# Patient Record
Sex: FEMALE | Race: WHITE
[De-identification: names, ages, dates, MRNs, and addresses within clinical notes are randomized per-mention and may not be internally consistent; named-entity substitution may affect disease eponyms.]

---

## 2017-10-13 NOTE — EDM.PDOC
ED HPI GENERAL MEDICAL PROBLEM





- General


Chief Complaint: General


Stated Complaint: anxiety attack


Time Seen by Provider: 10/13/17 10:10


Source of Information: Reports: Patient, Family (son)


History Limitations: Reports: No Limitations





- History of Present Illness


INITIAL COMMENTS - FREE TEXT/NARRATIVE: 





States has been having increase in anxiety attacks.  Has been having them daily 

this week.  Usually they start in the afternoon or later evening but this one 

started this morning.  The anxiety is aggravating her parkinson's as her 

tremors are much worse which is causing her to have muscle aches and pains from 

that. States that she is not sure what is making her anxious but she worries 

about everything.  She is worried about her heart although she denies any chest 

pains.  Has been taking her meds as ordered and does see a counseler at [facility] 

by the name of Félix.  She denies missing any of her doses at this time.  Son 

does get her meds ready for her.  No suicidal thoughts noted.


Onset: Gradual


Duration: Getting Worse


Associated Symptoms: Denies: Chest Pain, Diaphoresis, Nausea/Vomiting, 

Shortness of Breath, Syncope





- Related Data


 Allergies











Allergy/AdvReac Type Severity Reaction Status Date / Time


 


amoxicillin Allergy  Cannot Verified 10/13/17 10:03





   Remember  


 


cephalexin [From Keflex] Allergy  Cannot Verified 10/13/17 10:03





   Remember  


 


clavulanic acid Allergy  Cannot Verified 10/13/17 10:03





[From Augmentin]   Remember  


 


lomefloxacin [From Maxaquin] Allergy  Cannot Verified 10/13/17 10:03





   Remember  


 


Quinolones Allergy  Cannot Verified 10/13/17 10:03





   Remember  


 


terbutaline [From Brethine] Allergy  Cannot Verified 10/13/17 10:03





   Remember  











Home Meds: 


 Home Meds





Aspirin [Low Dose Aspirin EC] 81 mg PO BEDTIME 10/17/16 [History]


Bisacodyl [Dulcolax] 10 mg RECTAL DAILY PRN 10/17/16 [History]


Carbidopa/Levodopa [Sinemet  mg Tablet] 1 tab PO ASDIRECTED 10/17/16 [

History]


Carboxymethylcellulose Sodium [Refresh Tears] 1 drop EYEBOTH BID 10/17/16 [

History]


Cyanocobalamin (Vitamin B12) [Vitamin B12] 1,000 mcg IM Q30D 10/17/16 [History]


Docusate Sodium [Colace] 100 mg PO DAILY 10/17/16 [History]


Levothyroxine Sodium 100 mcg PO DAILY 10/17/16 [History]


Melatonin 5 mg PO BEDTIME 10/17/16 [History]


Mirtazapine 30 mg PO BEDTIME 10/17/16 [History]


clonazePAM [Clonazepam] 0.5 mg PO 06,08,13 10/17/16 [History]


clonazePAM [Clonazepam] 1 mg PO 1600 10/17/16 [History]


Polyethylene Glycol 3350 [MiraLAX] 17 gm PO DAILY 01/04/17 [History]


Sucralfate 1 gm PO QID 09/27/17 [History]


busPIRone HCl [Buspirone HCl] 5 mg PO ASDIRECTED 09/27/17 [History]











Past Medical History


HEENT History: Reports: Sinusitis


Cardiovascular History: Reports: Hypertension


Neurological History: Reports: Parkinson's


Psychiatric History: Reports: Anxiety, Depression


Hematologic History: Reports: B12 Deficiency





- Past Surgical History


HEENT Surgical History: Reports: Tonsillectomy


Female  Surgical History: Reports: Tubal Ligation


Musculoskeletal Surgical History: Reports: Knee Replacement





Social & Family History





- Family History


Family Medical History: Noncontributory





- Tobacco Use


Smoking Status *Q: Never Smoker


Second Hand Smoke Exposure: No





- Recreational Drug Use


Recreational Drug Use: No





- Living Situation & Occupation


Living situation: Reports: Single, Alone


Occupation: Retired





ED ROS GENERAL





- Review of Systems


Review Of Systems: See Below


Constitutional: Reports: No Symptoms


HEENT: Reports: No Symptoms


Respiratory: Reports: No Symptoms


Cardiovascular: Reports: No Symptoms


GI/Abdominal: Reports: No Symptoms


: Reports: No Symptoms


Musculoskeletal: Reports: Muscle Pain.  Denies: Muscle Stiffness


Skin: Reports: No Symptoms


Neurological: Denies: Confusion, Dizziness, Headache


Psychiatric: Reports: Anxiety.  Denies: Hallucinations, Suicidal Ideation





ED EXAM, GENERAL





- Physical Exam


Exam: See Below


Exam Limited By: No Limitations


General Appearance: Alert, Anxious


Ears: Normal External Exam, Normal Canal, Normal TMs


Throat/Mouth: Normal Inspection, Normal Voice, No Airway Compromise


Head: Atraumatic, Normocephalic


Neck: Normal Inspection, Supple, Non-Tender, Full Range of Motion


Respiratory/Chest: No Respiratory Distress, Lungs Clear, Normal Breath Sounds, 

Chest Non-Tender


Cardiovascular: Regular Rate, Rhythm, No Edema


GI/Abdominal: Normal Bowel Sounds, Soft, Non-Tender, No Organomegaly


Extremities: Other (Has constant uncontrolled shaking of the right leg while 

lying on the bed.  Is able to stop it when she puts pressure on it or stands on 

it.)


Neurological: Alert, Oriented


Psychiatric: Anxious


Skin Exam: Warm, Dry





Course





- Vital Signs


Last Recorded V/S: 


 Last Vital Signs











Temp  98.2 F   10/13/17 11:05


 


Pulse  65   10/13/17 11:05


 


Resp  18   10/13/17 11:05


 


BP  146/80 H  10/13/17 11:05


 


Pulse Ox  97   10/13/17 11:05














- Orders/Labs/Meds


Labs: 


 Laboratory Tests











  10/13/17 10/13/17 10/13/17 Range/Units





  10:49 10:51 10:51 


 


WBC   6.9   (5.0-10.0)  10^3/uL


 


RBC   4.64   (4.00-5.50)  10^6/uL


 


Hgb   13.6   (12.0-16.0)  g/dL


 


Hct   40.6   (37.0-47.0)  %


 


MCV   87.5   (82.0-94.0)  fL


 


MCH   29.3   (27.0-32.0)  pg


 


MCHC   33.5   (33.0-38.0)  g/dL


 


RDW Coeff of Marly   13.4   (11.0-15.0)  %


 


Plt Count   166   (150-400)  10^3/uL


 


Neut % (Auto)   77.4   (35-85)  %


 


Lymph % (Auto)   16.2   (10-55)  %


 


Mono % (Auto)   6.0   (0-16)  %


 


Eos % (Auto)   0.3   (0-5)  %


 


Baso % (Auto)   0.1   (0-3)  %


 


Neut # (Auto)   5.31   (1.80-7.00)  10^3/uL


 


Lymph # (Auto)   1.11   (1.00-4.80)  10^3/uL


 


Mono # (Auto)   0.41   (0.00-0.80)  10^3/uL


 


Eos # (Auto)   0.02   (0.00-0.45)  10^3/uL


 


Baso # (Auto)   0.01   10^3/uL


 


Sodium    140  (136-145)  mEq/L


 


Potassium    4.7  (3.5-5.0)  mEq/L


 


Chloride    105  ()  mEq/L


 


Carbon Dioxide    29  (21-32)  mmol/L


 


BUN    19 H  (7-18)  mg/dL


 


Creatinine    0.7  (0.6-1.0)  mg/dL


 


Est Cr Clr Drug Dosing    TNP  


 


Estimated GFR (MDRD)    > 60  (>=60)  mL/min


 


Glucose    101 H  (75-99)  mg/dL


 


Calcium    8.9  (8.4-10.1)  mg/dL


 


Total Bilirubin    0.3  (0.0-1.0)  mg/dL


 


AST    16  (15-37)  U/L


 


ALT    13  (12-78)  U/L


 


Alkaline Phosphatase    107  ()  U/L


 


Total Protein    7.0  (6.4-8.2)  g/dL


 


Albumin    3.9  (3.4-5.0)  g/dL


 


TSH, Ultra Sensitive    0.11 L  (0.36-5.60)  uIU/mL


 


Urine Color  Yellow    (YELLOW)  


 


Urine Appearance  Clear    (CLEAR)  


 


Urine pH  7.5    (4.5-8.0)  


 


Ur Specific Gravity  1.015    (1.003-1.020)  


 


Urine Protein  Negative    (NEGATIVE)  mg/dL


 


Urine Glucose (UA)  Negative    (NEGATIVE)  mg/dL


 


Urine Ketones  Negative    (NEGATIVE)  mg/dL


 


Urine Occult Blood  Negative    (NEGATIVE)  


 


Urine Nitrite  Negative    (NEGATIVE)  


 


Urine Bilirubin  Negative    (NEGATIVE)  


 


Urine Urobilinogen  1.0    (0.2-1.0)  EU/dL


 


Ur Leukocyte Esterase  Negative    (NEGATIVE)  


 


Urine RBC  Not seen    (0-5)  /HPF


 


Urine WBC  Not seen    (0-5)  /HPF


 


Ur Squamous Epith Cells  Occasional H    (NOT SEEN)  /HPF











Meds: 


Medications














Discontinued Medications














Generic Name Dose Route Start Last Admin





  Trade Name Freq  PRN Reason Stop Dose Admin


 


Lorazepam  Confirm  10/13/17 12:01  10/13/17 12:42





  Ativan  Administered  10/13/17 12:02  Not Given





  Dose   





  2 mg   





  .ROUTE   





  .STK-MED ONE   


 


Lorazepam  1 mg  10/13/17 12:00  10/13/17 12:10





  Ativan  IM  10/13/17 12:01  1 mg





  ONETIME ONE   Administration














Departure





- Departure


Time of Disposition: 11:51


Disposition: Home, Self-Care 01


Condition: Good


Clinical Impression: 


 Anxiety








- Discharge Information


Forms:  ED Department Discharge


Additional Instructions: 


Increase the clonazepam to 1 mg 4 times a day.  


Keep appt with Dr. Angulo on Tuesday








- Problem List & Annotations


(1) Anxiety


SNOMED Code(s): 04564884


   Code(s): F41.9 - ANXIETY DISORDER, UNSPECIFIED   Status: Chronic   Priority: 

High   





- Problem List Review


Problem List Initiated/Reviewed/Updated: Yes





- Assessment/Plan


Plan: 





Discussed with DR. Angulo and recommend med changes as noted above.  Dr. Angulo 

will see on follow up Tuesday.  Return to the ER if symptoms do not improve

## 2017-10-19 NOTE — PCM.PN
- General Info


Date of Service: 10/18/17


Admission Dx/Problem (Free Text): 





Short of Breath


Anxiety





Functional Status: Reports: Pain Controlled, Tolerating Diet





- Review of Systems


General: Reports: Weakness, Fatigue.  Denies: Fever


HEENT: Reports: No Symptoms


Pulmonary: Denies: Shortness of Breath, Cough, Sputum


Cardiovascular: Denies: Chest Pain, Edema, Lightheadedness


Gastrointestinal: Denies: Abdominal Pain, Nausea, Vomiting


Genitourinary: Reports: No Symptoms


Musculoskeletal: Reports: No Symptoms


Skin: Reports: No Symptoms


Neurological: Reports: No Symptoms


Psychiatric: Reports: Anxiety





- Patient Data


Vitals - Most Recent: 


 Last Vital Signs











Temp  97.1 F   10/19/17 04:00


 


Pulse  68   10/19/17 04:00


 


Resp  18   10/19/17 04:00


 


BP  130/59 L  10/19/17 04:00


 


Pulse Ox  98   10/19/17 04:00











Weight - Most Recent: 135 lb 11.2 oz


Med Orders - Current: 


 Current Medications





Acetaminophen (Tylenol)  650 mg PO Q4H PRN


   PRN Reason: Pain (Mild 1-3)/fever


   Last Admin: 10/19/17 03:50 Dose:  650 mg


Artificial Tears (Liquitears 1.4% Ophth Soln)  0 ml EYEBOTH 0700,2100 [facility]


   Last Admin: 10/19/17 06:57 Dose:  1 drop


Aspirin (Halfprin)  81 mg PO 2100 [facility]


   Last Admin: 10/18/17 20:52 Dose:  81 mg


Bisacodyl (Dulcolax)  10 mg RECTAL DAILY PRN


   PRN Reason: Constipation


Carbidopa/Levodopa (Sinemet  Mg)  1 tab PO 1700,1900 [facility]


   Last Admin: 10/18/17 18:58 Dose:  1 tab


Carbidopa/Levodopa (Sinemet  Mg)  1.5 tab PO 07,09,11,13,15 [facility]


   Last Admin: 10/19/17 06:57 Dose:  1.5 tab


Clonazepam (Klonopin)  1 mg PO 0700,0900,1300,1600 [facility]


   Last Admin: 10/19/17 06:57 Dose:  1 mg


Cyanocobalamin (Vitamin B12)  1,000 mcg IM Q30D [facility]


Enoxaparin Sodium (Lovenox)  30 mg SUBCUT Q24H [facility]


   Last Admin: 10/18/17 20:52 Dose:  30 mg


Levothyroxine Sodium (Synthroid)  100 mcg PO 0700 [facility]


   Last Admin: 10/19/17 07:00 Dose:  100 mcg


Mirtazapine (Remeron)  30 mg PO 2100 [facility]


   Last Admin: 10/18/17 20:52 Dose:  30 mg


Ptom[Melatonin] 5 Mg ())  5 mg PO 2100 [facility]


   Last Admin: 10/18/17 20:53 Dose:  Not Given


Ondansetron HCl (Zofran Odt)  4 mg PO Q6H PRN


   PRN Reason: Nausea/Vomiting


   Last Admin: 10/17/17 15:23 Dose:  4 mg


Polyethylene Glycol (Miralax)  17 gm PO DAILY [facility]


   Last Admin: 10/19/17 08:01 Dose:  17 gm


Sertraline HCl (Zoloft)  50 mg PO 1100 [facility]


   Last Admin: 10/18/17 11:00 Dose:  50 mg


Sodium Chloride (Saline Flush)  10 ml FLUSH ASDIRECTED PRN


   PRN Reason: Keep Vein Open


Temazepam (Restoril)  15 mg PO BEDTIME PRN


   PRN Reason: Sleep


   Last Admin: 10/17/17 20:07 Dose:  15 mg











- Exam


General: Alert, Oriented


HEENT: Mucous Membr. Moist/Pink


Neck: Supple


Lungs: Clear to Auscultation, Normal Respiratory Effort


Cardiovascular: Regular Rate, Regular Rhythm


GI/Abdominal Exam: Normal Bowel Sounds, Soft, Non-Tender


Skin: Warm, Dry


Neurological: No New Focal Deficit





- Problem List & Annotations


(1) Anxiety


SNOMED Code(s): 25325459


   Code(s): F41.9 - ANXIETY DISORDER, UNSPECIFIED   Status: Chronic   Priority: 

High   Current Visit: Yes   





(2) Shortness of breath


SNOMED Code(s): 202095000


   Code(s): R06.02 - SHORTNESS OF BREATH   Status: Acute   Priority: High   

Current Visit: Yes   





- Problem List Review


Problem List Initiated/Reviewed/Updated: Yes





- Assessment


Assessment:: 





Anxiety


SOB





- Plan


Plan:: 





Patient doing better today.  States less short of breath today, feels anxiety 

is better.  Does admit to being very depressed.  Cardiac enzymes, d-dimer, work 

up on admit was negative.  Tolerating diet.





Encouraged to ambulate.  Possible discharge home tomorrow.

## 2017-10-19 NOTE — PCM.PN
- General Info


Date of Service: 10/19/17


Admission Dx/Problem (Free Text): 





Short of Breath


Anxiety





Functional Status: Reports: Pain Controlled, Tolerating Diet, Ambulating





- Review of Systems


General: Reports: Weakness, Fatigue.  Denies: Fever


HEENT: Reports: No Symptoms


Pulmonary: Denies: Shortness of Breath, Cough


Cardiovascular: Denies: Chest Pain, Edema, Lightheadedness


Gastrointestinal: Denies: Abdominal Pain, Nausea, Vomiting


Musculoskeletal: Reports: No Symptoms


Skin: Reports: No Symptoms


Psychiatric: Reports: Depression, Anxiety





- Patient Data


Vitals - Most Recent: 


 Last Vital Signs











Temp  97.7 F   10/19/17 08:00


 


Pulse  95   10/19/17 08:00


 


Resp  20   10/19/17 08:00


 


BP  134/47 L  10/19/17 08:00


 


Pulse Ox  96   10/19/17 08:00











Weight - Most Recent: 135 lb 11.2 oz


Med Orders - Current: 


 Current Medications





Acetaminophen (Tylenol)  650 mg PO Q4H PRN


   PRN Reason: Pain (Mild 1-3)/fever


   Last Admin: 10/19/17 03:50 Dose:  650 mg


Artificial Tears (Liquitears 1.4% Ophth Soln)  0 ml EYEBOTH 0700,2100 [facility]


   Last Admin: 10/19/17 06:57 Dose:  1 drop


Aspirin (Halfprin)  81 mg PO 2100 [facility]


   Last Admin: 10/18/17 20:52 Dose:  81 mg


Bisacodyl (Dulcolax)  10 mg RECTAL DAILY PRN


   PRN Reason: Constipation


Carbidopa/Levodopa (Sinemet  Mg)  1 tab PO 1700,1900 [facility]


   Last Admin: 10/18/17 18:58 Dose:  1 tab


Carbidopa/Levodopa (Sinemet  Mg)  1.5 tab PO 07,09,11,13,15 [facility]


   Last Admin: 10/19/17 10:59 Dose:  1.5 tab


Clonazepam (Klonopin)  1 mg PO 0700,0900,1300,1600 [facility]


   Last Admin: 10/19/17 09:03 Dose:  1 mg


Cyanocobalamin (Vitamin B12)  1,000 mcg IM Q30D [facility]


Enoxaparin Sodium (Lovenox)  30 mg SUBCUT Q24H [facility]


   Last Admin: 10/18/17 20:52 Dose:  30 mg


Levothyroxine Sodium (Synthroid)  100 mcg PO 0700 [facility]


   Last Admin: 10/19/17 07:00 Dose:  100 mcg


Mirtazapine (Remeron)  30 mg PO 2100 [facility]


   Last Admin: 10/18/17 20:52 Dose:  30 mg


Ptom[Melatonin] 5 Mg ())  5 mg PO 2100 [facility]


   Last Admin: 10/18/17 20:53 Dose:  Not Given


Ondansetron HCl (Zofran Odt)  4 mg PO Q6H PRN


   PRN Reason: Nausea/Vomiting


   Last Admin: 10/17/17 15:23 Dose:  4 mg


Polyethylene Glycol (Miralax)  17 gm PO DAILY [facility]


   Last Admin: 10/19/17 08:01 Dose:  17 gm


Sertraline HCl (Zoloft)  50 mg PO 1100 [facility]


   Last Admin: 10/19/17 10:59 Dose:  50 mg


Sodium Chloride (Saline Flush)  10 ml FLUSH ASDIRECTED PRN


   PRN Reason: Keep Vein Open


Temazepam (Restoril)  15 mg PO BEDTIME PRN


   PRN Reason: Sleep


   Last Admin: 10/17/17 20:07 Dose:  15 mg











- Exam


General: Alert, Oriented


HEENT: Mucous Membr. Moist/Pink


Neck: Supple


Lungs: Clear to Auscultation, Normal Respiratory Effort


Cardiovascular: Regular Rate, Regular Rhythm


GI/Abdominal Exam: Normal Bowel Sounds, Soft, Non-Tender


Extremities: Normal Inspection, No Pedal Edema


Skin: Warm, Dry


Neurological: No New Focal Deficit





- Problem List & Annotations


(1) Depression


SNOMED Code(s): 81805525


   Code(s): F32.9 - MAJOR DEPRESSIVE DISORDER, SINGLE EPISODE, UNSPECIFIED   

Status: Acute   Priority: High   Current Visit: Yes   


Qualifiers: 


   Depression Type: major depressive disorder   Major depression recurrence: 

recurrent   Active/Remission status: currently active   Major depression 

episode severity: severe   Psychotic features: without psychotic features   

Qualified Code(s): F33.2 - Major depressive disorder, recurrent severe without 

psychotic features   





(2) Anxiety


SNOMED Code(s): 40427483


   Code(s): F41.9 - ANXIETY DISORDER, UNSPECIFIED   Status: Chronic   Priority: 

High   Current Visit: Yes   





(3) Shortness of breath


SNOMED Code(s): 088323170


   Code(s): R06.02 - SHORTNESS OF BREATH   Status: Acute   Priority: High   

Current Visit: Yes   





- Problem List Review


Problem List Initiated/Reviewed/Updated: Yes





- Assessment


Assessment:: 


Depression


Anxiety


SOB





- Plan


Plan:: 





Patient doing better today.  States less short of breath today, feels anxiety 

is better.  Does admit to being very depressed.  Cardiac enzymes, d-dimer, work 

up on admit was negative.  Tolerating diet.





Encouraged to ambulate.  Possible discharge home tomorrow.





10-


Patient much more down today, sad.  Talking of death and how she wishes she 

"could just die".  Denies being suicidal but does feel very hopeless.  Not 

wanting to get up and moving much today.  Denies feeling short of breath.  Does 

still feel anxious. 





Did discuss nursing home placement with patient as has had difficulty in the 

past especially during the winter months with intractable depression and do 

feel she is vulnerable as she admits that she doesn't much cook for herself, 

doesn't drive.  States only eats microwave food as she doesn't trust herself 

using the stove.  Staff will consult son in regards to placement versus home 

with home health.

## 2017-10-23 NOTE — DISCH
ADMISSION DIAGNOSES:

1. Shortness of breath.

2. Severe depression.

3. Anxiety.

4. Parkinson disease.

5. Hypothyroidism.

 

DISCHARGE DIAGNOSIS:

1. SHORTNESS OF BREATH.

2. SEVERE DEPRESSION.

3. ANXIETY.

4. PARKINSON DISEASE.

5. HYPOTHYROIDISM.

 

HISTORY:  The patient presented to my office with ongoing issues with shortness

of breath.  She has been having severe issues with her depression and has a

history of longstanding major depressive disorder.  She has been having

increasing anxiety and the assumption is her shortness of breath is when her

anxiety is out of control.  We elected to put her in the hospital for

appropriate cares and workup to rule out any other pathology for her shortness

of breath.  At the time of her admission, her initial workup including chest x-

ray, EKG, and lab work were all fine.  She had negative cardiac enzymes and D-

dimer.  We elected to put her into the hospital for acute care and monitor and

do medication adjustments.

 

HOSPITAL COURSE:  The patient had her Klonopin increased while here.  She made a

some improvement with her anxiety.  She is still having issues.  She is starting

now to have more and more issues which she describes as hallucination.  It is

hard to tell if these are in dreams because they do happen at night, but she

says they feel very real.  She has severe depression and this has been

recalcitrant in the past.  She has been on Abilify without success.  We never

had her on the atypical antipsychotic Seroquel and I think that is the next

step.  For the most part, she has been stable here from a cardiopulmonary

standpoint and at this time she is a debilitated, lives at home alone and just

could not take care of herself due to her severe depression and anxiety.  We are

going to put her in swing bed.  I am going to take her off Remeron.  I am going

to put her on Seroquel.  We will start a low dose of 25 mg at night and titrate

up.  Plan to back off her Klonopin as she has been little more sedating.  We

will use a lowly scheduled dose of Ativan and then a p.r.n. dose as well.  She

will be put in swing bed for a short-term stay.  We will entertain nursing home

placement as well.

 

COMPLICATIONS:  During her stay were none.

 

CONSULTATIONS:  PT.

 

DISPOSITION:  Discharged to swing bed.

 

SARAH/LISA

DD:  10/20/2017 11:53:13

DT:  10/21/2017 01:18:23

Job #:  [id number]

## 2017-10-25 NOTE — PCM.DCSUM1
**Discharge Summary





- Hospital Course


Free Text/Narrative:: 





Patient admitted to swing bed with ongoing mental health concerns.  Has had 

ongoing issues with depression and anxiety.  Difficulty managing at home 

without her son involvement for assistance and meds.  Patient has 

hallucinations or description of dreams as these do occur at night.  Patient 

has tried multiple medications in the past for her mental health issues.  While 

admitted in acute, did have clonazepam increased.  Seroquel added on admission 

to swing bed as well as Zoloft increased.  Patient does often discuss issues 

with wanting to "be dead" but she also admits that she has never had a plan nor 

would she ever "actually try anything".  Admitted to swing bed for ongoing 

observation due to medication changes as she is a vulnerable patient and plan 

for nursing home admission with psychiatric care while there.





- Discharge Data


Discharge Date: 10/25/17


Discharge Disposition: DC/Tfer to [facility]


Condition: Fair





- Patient Summary/Data


Complications: none


Consults: 


 Consultations





10/20/17 13:21


Consult to  [CONS] Routine 





10/20/17 14:41


PT Evaluation and Treatment [CONS] Routine 











Hospital Course: 





Patient does continue to have episodes of increased anxiety and restlessness 

while in swing bed.  Does get anxious with changes, has ongoing "dreams".  Does 

calm with involvement of nursing staff.  Arrangements for ongoing care 

discussed with son due to her mood changes/anxiety.  Is agreeable to nursing 

home until medication adjustments are felt to be beneficial with plans to 

eventually be discharged back home.  Did have episode of chest pain with 

negative troponin and EKG





Will continue with increased dose of Zoloft, Seroquel.  Continue Clonazepam and 

Buspar.  





- Patient Instructions


Diet: Usual Diet as Tolerated


Activity: As Tolerated





- Discharge Plan


Prescriptions/Med Rec: 


QUEtiapine [SEROquel] 50 mg PO 2100 #30 tablet


Sertraline [Zoloft] 75 mg PO 1100 #30 tablet


Home Medications: 


 Home Meds





Aspirin [Low Dose Aspirin EC] 81 mg PO 2100 10/17/16 [History]


Bisacodyl [Dulcolax] 10 mg RECTAL DAILY PRN 10/17/16 [History]


Carbidopa/Levodopa [Sinemet  mg Tablet] 1.5 tab PO 07,09,11,13,15 10/17/

16 [History]


Carboxymethylcellulose Sodium [Refresh Tears] 1 drop EYEBOTH BID 10/17/16 [

History]


Cyanocobalamin (Vitamin B12) [Vitamin B12] 1,000 mcg IM Q30D 10/17/16 [History]


Levothyroxine Sodium 100 mcg PO 0700 10/17/16 [History]


Melatonin 5 mg PO 2100 10/17/16 [History]


Polyethylene Glycol 3350 [MiraLAX] 17 gm PO DAILY 01/04/17 [History]


Sucralfate 1 gm PO 0700,1100,1500 09/27/17 [History]


busPIRone HCl [Buspirone HCl] 5 mg PO 0700,1100,1600 09/27/17 [History]


Carbidopa/Levodopa [Sinemet  mg Tablet] 1 tab PO 1700,1900 10/17/17 [

History]


ClonazePAM [KlonoPIN] 0.5 mg PO 0700,0900,1300,1600  tablet 10/25/17 [Rx]


QUEtiapine [SEROquel] 50 mg PO 2100 #30 tablet 10/25/17 [Rx]


Sertraline [Zoloft] 75 mg PO 1100 #30 tablet 10/25/17 [Rx]











- Discharge Summary/Plan Comment


DC Time >30 min.: No (Discharge time)


Discharge Summary/Plan Comment: 





Discharge to [facility].  Continue Zoloft, Seroquel, Buspar and Clonazepam.





Time spent with patient 15 minutes


Time for orders 15 minutes


Documentation 10 minutes





- General Info


Date of Service: 10/25/17


Admission Dx/Problem (Free Text: 





Depression


Anxiety





Functional Status: Reports: Pain Controlled, Tolerating Diet, Ambulating





- Review of Systems


General: Denies: Fever, Weakness, Fatigue


HEENT: Reports: No Symptoms


Pulmonary: Denies: Shortness of Breath, Cough


Cardiovascular: Denies: Chest Pain, Edema, Lightheadedness


Gastrointestinal: Reports: No Symptoms


Genitourinary: Reports: No Symptoms


Musculoskeletal: Reports: No Symptoms


Skin: Reports: No Symptoms


Psychiatric: Reports: Depression, Anxiety, Hallucinations





- Patient Data


Vitals - Most Recent: 


 Last Vital Signs











Temp  96.4 F   10/25/17 08:00


 


Pulse  76   10/25/17 08:00


 


Resp  20   10/25/17 08:00


 


BP  89/51 L  10/25/17 08:00


 


Pulse Ox  95   10/25/17 08:00











Weight - Most Recent: 138 lb


Med Orders - Current: 


 Current Medications








Discontinued Medications





Acetaminophen (Tylenol)  650 mg PO Q4H PRN


   PRN Reason: Pain (Mild 1-3)/fever


   Last Admin: 10/24/17 22:35 Dose:  650 mg


Artificial Tears (Liquitears 1.4% Ophth Soln)  0 ml EYEBOTH 0700,2100 [facility]


   Last Admin: 10/25/17 06:23 Dose:  1 drop


Aspirin (Halfprin)  81 mg PO 2100 [facility]


   Last Admin: 10/24/17 20:05 Dose:  81 mg


Bisacodyl (Dulcolax)  10 mg RECTAL DAILY PRN


   PRN Reason: Constipation


Carbidopa/Levodopa (Sinemet  Mg)  1 tab PO 1700,1900 [facility]


   Last Admin: 10/24/17 18:54 Dose:  1 tab


Carbidopa/Levodopa (Sinemet  Mg)  1.5 tab PO 07,09,11,13,15 [facility]


   Last Admin: 10/25/17 10:52 Dose:  1.5 tab


Clonazepam (Klonopin)  0.5 mg PO 0700,0900,1300,1600 [facility]


   Last Admin: 10/25/17 09:07 Dose:  0.5 mg


Cyanocobalamin (Vitamin B12)  1,000 mcg IM Q30D [facility]


Enoxaparin Sodium (Lovenox)  30 mg SUBCUT Q24H [facility]


   Last Admin: 10/24/17 20:06 Dose:  30 mg


Ibuprofen (Motrin)  400 mg PO Q6H PRN


   PRN Reason: Pain


   Last Admin: 10/24/17 17:20 Dose:  400 mg


Levothyroxine Sodium (Synthroid)  100 mcg PO 0700 [facility]


   Last Admin: 10/25/17 06:23 Dose:  100 mcg


Lorazepam (Ativan)  0.5 mg IVPUSH BID [facility]


   Last Admin: 10/23/17 08:01 Dose:  0.5 mg


Ptom [Melatonin] 5 (Mg))  5 mg PO 2100 [facility]


   Last Admin: 10/24/17 20:06 Dose:  Not Given


Ondansetron HCl (Zofran Odt)  4 mg PO Q6H PRN


   PRN Reason: Nausea/Vomiting


Polyethylene Glycol (Miralax)  17 gm PO DAILY [facility]


   Last Admin: 10/25/17 08:07 Dose:  17 gm


Quetiapine Fumarate (Seroquel)  25 mg PO 2100 [facility]


   Stop: 10/22/17 23:59


   Last Admin: 10/22/17 20:37 Dose:  25 mg


Quetiapine Fumarate (Seroquel)  50 mg PO 2100 [facility]


   Last Admin: 10/24/17 20:05 Dose:  50 mg


Quetiapine Fumarate (Seroquel) Confirm Administered Dose 25 mg .ROUTE .STK-MED 

ONE


   Stop: 10/21/17 20:38


   Last Admin: 10/21/17 20:32 Dose:  Not Given


Sertraline HCl (Zoloft)  75 mg PO 1100 [facility]


   Last Admin: 10/25/17 10:52 Dose:  75 mg


Sodium Chloride (Saline Flush)  10 ml FLUSH ASDIRECTED PRN


   PRN Reason: Keep Vein Open











- Exam


General: Reports: Alert, Oriented


HEENT: Reports: Mucous Membr. Moist/Pink


Neck: Reports: Supple


Lungs: Reports: Clear to Auscultation, Normal Respiratory Effort


Cardiovascular: Reports: Regular Rate, Regular Rhythm


GI/Abdominal Exam: Normal Bowel Sounds, Soft, Non-Tender





*Q Meaningful Use (DIS)





- VTE *Q


VTE Criteria *Q: 








- Stroke *Q


Stroke Criteria *Q: 








- AMI *Q


AMI Criteria *Q:

## 2020-06-05 ENCOUNTER — HOSPITAL ENCOUNTER (EMERGENCY)
Dept: HOSPITAL 38 - CC.ED | Age: 75
Discharge: HOME | End: 2020-06-05
Payer: MEDICARE

## 2020-06-05 VITALS — DIASTOLIC BLOOD PRESSURE: 77 MMHG | SYSTOLIC BLOOD PRESSURE: 135 MMHG | HEART RATE: 94 BPM

## 2020-06-05 DIAGNOSIS — K63.89: Primary | ICD-10-CM

## 2020-06-05 DIAGNOSIS — Z51.5: ICD-10-CM

## 2020-06-05 DIAGNOSIS — G20: ICD-10-CM

## 2020-06-05 DIAGNOSIS — F03.90: ICD-10-CM

## 2020-06-05 DIAGNOSIS — R59.0: ICD-10-CM

## 2020-06-05 DIAGNOSIS — E03.9: ICD-10-CM

## 2020-06-05 DIAGNOSIS — Z88.1: ICD-10-CM

## 2020-06-05 DIAGNOSIS — Z88.8: ICD-10-CM

## 2020-06-05 DIAGNOSIS — Z79.82: ICD-10-CM

## 2020-06-05 DIAGNOSIS — F32.9: ICD-10-CM

## 2020-06-05 DIAGNOSIS — Z79.899: ICD-10-CM

## 2020-06-05 DIAGNOSIS — R16.0: ICD-10-CM

## 2020-06-05 DIAGNOSIS — N63.20: ICD-10-CM

## 2020-06-05 DIAGNOSIS — F41.9: ICD-10-CM

## 2020-06-05 LAB
CHLORIDE SERPL-SCNC: 101 MEQ/L (ref 98–106)
SODIUM SERPL-SCNC: 137 MEQ/L (ref 136–145)

## 2020-06-05 NOTE — EDM.PDOC
ED HPI GENERAL MEDICAL PROBLEM





- General


Chief Complaint: General


Stated Complaint: RLQ pain, hallucinations


Time Seen by Provider: 06/05/20 07:48


Source of Information: Reports: Patient


History Limitations: Reports: No Limitations





- History of Present Illness


INITIAL COMMENTS - FREE TEXT/NARRATIVE: 





Joana is a 76 yo female with PMH of Parkinsons, dementia, hypothyroidism, 

anxiety, and recalcitrant depression, who presents to the ED via [facility] with c/o abdominal pain. Patient resides at [facility]. Reportedly called EMS 

per self with c/o abdominal pain and making bizarre accusations that 3 people 

were murdered in the room next to her. She reportedly has been having more 

hallucinations lately. Was recently started on Seroquel. Has been having some 

medication changes per her psychiatrist as her hallucinations and paranoia have 

increased. Apparently had recent significant adverse reaction to Lexapro. Has 

been doing better since Lexapro stopped. 





Patient is alert to person, place, and time. Is unsure where she lives. She 

reports that for approximately the past 5 days she has had decreased appetite 

and not feeling well. Reports that she believes her abdominal pain has been 

ongoing at least the last 3-4 days, but most likely longer. Reports she cannot 

remember exactly. She reports her abdomen feels tight and "full." Has been 

having 3-4 loose stools a day the last few days per her report. She denies any N

/V/D, urinary symptoms, fever, chills, malaise, chest pain, or shortness of 

breath. 


Onset: Gradual


Onset Date: 06/01/20


Duration: Getting Worse


Location: Reports: Abdomen


Quality: Reports: Ache, Other ("Full")


Severity: Severe


Associated Symptoms: Reports: Confusion, Loss of Appetite.  Denies: Chest Pain, 

Cough, cough w sputum, Diaphoresis, Fever/Chills, Headaches, Malaise, Nausea/

Vomiting, Rash, Seizure, Shortness of Breath, Syncope, Weakness


  ** Right Lower Abdominal


Pain Score (Numeric/FACES): 9





- Related Data


 Allergies











Allergy/AdvReac Type Severity Reaction Status Date / Time


 


amoxicillin Allergy  Cannot Verified 06/05/20 07:46





   Remember  


 


cephalexin [From Keflex] Allergy  Cannot Verified 06/05/20 07:46





   Remember  


 


clavulanic acid Allergy  Cannot Verified 06/05/20 07:46





[From Augmentin]   Remember  


 


escitalopram [From Lexapro] Allergy  Cannot Verified 06/05/20 07:45





   Remember  


 


lomefloxacin [From Maxaquin] Allergy  Cannot Verified 06/05/20 07:46





   Remember  


 


Quinolones Allergy  Cannot Verified 06/05/20 07:46





   Remember  


 


terbutaline [From Brethine] Allergy  Cannot Verified 06/05/20 07:46





   Remember  











Home Meds: 


 Home Meds





Aspirin [Low Dose Aspirin EC] 81 mg PO BEDTIME 10/17/16 [History]


Carbidopa/Levodopa [Sinemet  mg Tablet] 1.5 tab PO 0700 10/17/16 [History]


Cyanocobalamin (Vitamin B12) [Vitamin B12] 1,000 mcg IM Q30D 10/17/16 [History]


Levothyroxine Sodium 100 mcg PO DAILY 10/17/16 [History]


Melatonin 5 mg PO BEDTIME 10/17/16 [History]


Polyethylene Glycol 3350 [MiraLAX] 8.5 gm PO DAILY 01/04/17 [History]


busPIRone HCl [Buspirone HCl] 15 mg PO 0700,1100,1600 09/27/17 [History]


Carbidopa/Levodopa [Sinemet  mg Tablet] 2 tab PO 1000,1300 10/17/17 [

History]


Carbidopa/Levodopa [Carbidopa-Levodopa ] 1 tab PO 1600,1900 06/05/20 [

History]


ClonazePAM [KlonoPIN] 1 mg PO BID 06/05/20 [History]


Diphenhyd/Lidocaine/Nystatin [Magic Mouthwash] 1 tsp PO TID PRN 06/05/20 [

History]


Lactulose [Cephulac] 20 gm PO BID #60 cup 06/05/20 [Rx]


Mag Hydrox/Aluminum Hyd/Simeth [Mag-Al Hydrox-Simeth Max Susp] 30 ml PO QID PRN 

06/05/20 [History]


Mirtazapine 30 mg PO BEDTIME 06/05/20 [History]


Ondansetron [Zofran] 4 mg PO Q8H PRN 06/05/20 [History]


Pantoprazole Sodium [Protonix] 40 mg PO QAM 06/05/20 [History]


QUEtiapine Fumarate [Quetiapine Fumarate] 100 mg PO BEDTIME 06/05/20 [History]


Tetrahydrozoline HCl [Visine] 1 drop EYEBOTH BID 06/05/20 [History]


amantadine HCL [Amantadine] 100 mg PO QAM 06/05/20 [History]


oxyCODONE 5 - 10 mg PO Q4HR PRN #90 tab 06/05/20 [Rx]











Past Medical History


HEENT History: Reports: Cataract, Sinusitis


Cardiovascular History: Reports: Hypertension


OB/GYN History: Reports: Pregnancy


Neurological History: Reports: Parkinson's


Psychiatric History: Reports: Anxiety, Depression


Endocrine/Metabolic History: Reports: Hypothyroidism


Hematologic History: Reports: Anemia, B12 Deficiency





- Past Surgical History


HEENT Surgical History: Reports: Cataract Surgery, Tonsillectomy


Female  Surgical History: Reports: Tubal Ligation


Musculoskeletal Surgical History: Reports: Knee Replacement, Other (See Below)


Other Musculoskeletal Surgeries/Procedures:: BILAT FOOT SURGERY





Social & Family History





- Family History


Family Medical History: Noncontributory





- Caffeine Use


Caffeine Use: Reports: Coffee





- Living Situation & Occupation


Living situation: Reports: Single, Alone


Occupation: Retired





ED ROS GENERAL





- Review of Systems


Review Of Systems: See Below


Constitutional: Reports: Decreased Appetite.  Denies: Fever, Chills, Malaise, 

Weakness, Fatigue


HEENT: Reports: No Symptoms


Respiratory: Reports: No Symptoms.  Denies: Shortness of Breath, Cough


Cardiovascular: Reports: No Symptoms.  Denies: Chest Pain, Edema


Endocrine: Reports: No Symptoms


GI/Abdominal: Reports: Abdominal Pain, Diarrhea, Decreased Appetite.  Denies: 

Black Stool, Bloody Stool, Constipation, Nausea, Vomiting


: Reports: Dysuria.  Denies: Flank Pain, Frequency, Urgency


Musculoskeletal: Reports: No Symptoms


Skin: Reports: No Symptoms


Neurological: Reports: Confusion


Psychiatric: Reports: Anxiety, Confusion, Depression, Hallucinations


Hematologic/Lymphatic: Reports: No Symptoms


Immunologic: Reports: No Symptoms





ED EXAM, GENERAL





- Physical Exam


Exam: See Below


Exam Limited By: Altered Mental Status (dementia, hallucinations)


General Appearance: Alert, WD/WN, No Apparent Distress


Eye Exam: Bilateral Eye: EOMI, Normal Fundi, Normal Inspection, PERRL


Throat/Mouth: Other (dry mucous membranes)


Head: Atraumatic, Normocephalic


Neck: Normal Inspection, Supple, Non-Tender, Full Range of Motion


Respiratory/Chest: No Respiratory Distress, Lungs Clear, Normal Breath Sounds, 

No Accessory Muscle Use, Chest Non-Tender


Cardiovascular: Normal Peripheral Pulses, Regular Rate, Rhythm, No Edema, No 

Gallop, No JVD, No Murmur, No Rub


GI/Abdominal: Normal Bowel Sounds, Soft, Non-Tender, No Distention, 

Hepatomegaly.  No: Guarding, Splenomegaly


Back Exam: Normal Inspection, Full Range of Motion.  No: CVA Tenderness (L), 

CVA Tenderness (R)


Extremities: Normal Inspection, Normal Range of Motion, Non-Tender, Normal 

Capillary Refill, No Pedal Edema


Neurological: Alert, Oriented, CN II-XII Intact, No Motor/Sensory Deficits


Psychiatric: Normal Affect, Normal Mood


Skin Exam: Warm, Dry, Intact, Normal Color, No Rash





Course





- Vital Signs


Last Recorded V/S: 


 Last Vital Signs











Temp  97.7 F   06/05/20 13:01


 


Pulse  94   06/05/20 13:01


 


Resp  20   06/05/20 13:01


 


BP  135/77   06/05/20 13:01


 


Pulse Ox  96   06/05/20 13:01














- Orders/Labs/Meds


Labs: 


 Laboratory Tests











  06/05/20 06/05/20 06/05/20 Range/Units





  07:22 07:30 07:30 


 


WBC   14.0 H   (5.0-10.0)  10^3/uL


 


RBC   4.54   (4.00-5.50)  10^6/uL


 


Hgb   9.7 L   (12.0-16.0)  g/dL


 


Hct   32.7 L   (37.0-47.0)  %


 


MCV   72.0 L   (82.0-94.0)  fL


 


MCH   21.4 L   (27.0-32.0)  pg


 


MCHC   29.7 L   (33.0-38.0)  g/dL


 


RDW Coeff of Marly   20.0 H   (11.0-15.0)  %


 


Plt Count   328   (150-400)  10^3/uL


 


Neut % (Auto)   85.8 H   (35-85)  %


 


Lymph % (Auto)   7.4 L   (10-55)  %


 


Mono % (Auto)   6.6   (0-16)  %


 


Eos % (Auto)   0.1   (0-5)  %


 


Baso % (Auto)   0.1   (0-3)  %


 


Neut # (Auto)   12.03 H   (1.80-7.00)  10^3/uL


 


Lymph # (Auto)   1.04   (1.00-4.80)  10^3/uL


 


Mono # (Auto)   0.92 H   (0.00-0.80)  10^3/uL


 


Eos # (Auto)   0.01   (0.00-0.45)  10^3/uL


 


Baso # (Auto)   0.02   10^3/uL


 


Sodium    137  (136-145)  mEq/L


 


Potassium    4.1  (3.5-5.0)  mEq/L


 


Chloride    101  ()  mEq/L


 


Carbon Dioxide    23  (21-32)  mmol/L


 


BUN    15  (7-18)  mg/dL


 


Creatinine    1.1 H  (0.6-1.0)  mg/dL


 


Est Cr Clr Drug Dosing    TNP  


 


Estimated GFR (MDRD)    48 L  (>=60)  mL/min


 


Glucose    116 H  (75-99)  mg/dL


 


Calcium    8.3 L  (8.4-10.1)  mg/dL


 


Total Bilirubin    2.0 H  (0.0-1.0)  mg/dL


 


AST    68 H  (15-37)  U/L


 


ALT    11 L  (12-78)  U/L


 


Alkaline Phosphatase    987 H  ()  U/L


 


C-Reactive Protein    15.6 H  (0.2-0.8)  mg/dL


 


Total Protein    6.2 L  (6.4-8.2)  g/dL


 


Albumin    2.9 L  (3.4-5.0)  g/dL


 


Amylase    15 L  ()  U/L


 


Lipase    58 L  ()  U/L


 


Urine Color  Dark yellow    (YELLOW)  


 


Urine Appearance  Slightly cloudy    (CLEAR)  


 


Urine pH  5.5    (4.5-8.0)  


 


Ur Specific Gravity  >= 1.030 H    (1.003-1.020)  


 


Urine Protein  30 H    (NEGATIVE)  mg/dL


 


Urine Glucose (UA)  100 H    (NEGATIVE)  mg/dL


 


Urine Ketones  15 H    (NEGATIVE)  mg/dL


 


Urine Occult Blood  Negative    (NEGATIVE)  


 


Urine Nitrite  Negative    (NEGATIVE)  


 


Urine Bilirubin  Large H    (NEGATIVE)  


 


Urine Urobilinogen  >=8.0 H    (0.2-1.0)  EU/dL


 


Ur Leukocyte Esterase  Negative    (NEGATIVE)  


 


Urine RBC  Not seen    (0-5)  /HPF


 


Urine WBC  0-5    (0-5)  /HPF


 


Ur Squamous Epith Cells  Moderate H    (NOT SEEN)  /HPF


 


Amorphous Sediment  Few H    (NOT SEEN)  /HPF


 


Urine Mucus  Many H    (NOT SEEN)  /HPF











Meds: 


Medications














Discontinued Medications














Generic Name Dose Route Start Last Admin





  Trade Name Leandro  PRN Reason Stop Dose Admin


 


Barium Sulfate  900 ml  06/05/20 11:44  06/05/20 11:45





  Readi-Cat 2  PO  06/05/20 11:45  900 ml





  ONETIME ONE   Administration





     





     





     





     


 


Lactated Ringer's  1,000 mls @ 500 mls/hr  06/05/20 08:00  06/05/20 08:06





  Ringers, Lactated  IV  06/05/20 09:59  500 mls/hr





  .BOLUS ONE   Administration





     





     





     





     


 


Iopamidol  100 ml  06/05/20 09:21  06/05/20 11:44





  Isovue-370 (76%)  IVPUSH  06/05/20 09:22  100 ml





  ONETIME ONE   Administration





     





     





     





     


 


Morphine Sulfate  2 mg  06/05/20 08:02  06/05/20 08:09





  Morphine  IVPUSH  06/05/20 08:03  2 mg





  ONETIME ONE   Administration





     





     





     





     














- Radiology Interpretation


Free Text/Narrative:: 





Liver with multiple masses. Mass noted to left breast. Concerning findings of 

colon. Likely metastatic colon cancer. 


CT Results Date: 06/05/20


CT Results Time: 12:10





- Re-Assessments/Exams


Free Text/Narrative Re-Assessment/Exam: 





06/05/20 09:16


US shows significantly enlarged liver. Consulted with radiologist. Will proceed 

with CT abdomen/pelvis. Patient will have to drink oral contrast for this. Will 

keep in extended ER. 








06/05/20 12:30


Discussed CT results with Dr. Angulo, who is also patients PCP in SNF. Son and 

patient unsure of previous colonoscopy. Patient previously doctor with Dr. Morris. Will try to obtain records. 





I discussed CT findings with patient and patient's son Trevor Sanchez. Discussed 

significance of likely stage 4 colon cancer and whether or not they would like 

to seek treatment. Patient reports she does not want to prolong life and would 

rather be kept comfortable. Son agrees, but would like to further discuss this 

with his siblings. 





Dr. Angulo contacted Darlin Polanco RN at [facility] regarding findings and plan. They 

will arrange for family care conference if family wishes or has further 

questions. 





Length of ED visit prolonged due to significant findings requiring additional 

time for workup. 








Departure





- Departure


Time of Disposition: 13:16


Disposition: Home, Self-Care 01


Condition: Poor


Clinical Impression: 


 Liver masses, Mass of colon, Lymphadenopathy, abdominal, Left breast mass








- Discharge Information


*PRESCRIPTION DRUG MONITORING PROGRAM REVIEWED*: Yes


*COPY OF PRESCRIPTION DRUG MONITORING REPORT IN PATIENT BRY: Yes


Prescriptions: 


oxyCODONE 5 - 10 mg PO Q4HR PRN #90 tab


 PRN Reason: Pain


Lactulose [Cephulac] 20 gm PO BID #60 cup


Referrals: 


Liana De La O, NP [Emergency Provider] - 


Forms:  ED Department Discharge


Additional Instructions: 


- Stop Tylenol


- Start Lactulose 20 mg BID


- Start Oxycodone 5 mg 1-2 tablets every 4-6 hours as needed for pain


- Recommend consultation with psychiatry regarding continued Seroquel and 

Remeron


- LHGS to arrange family care conference with Dr. Angulo if they so wish to 

discuss further treatment options- colonoscopy for biopsy/oncology referral vs. 

hospice. This should be made over the weekend so if they so desire to proceed 

with treatment we can get that started ASAP. 











Sepsis Event Note





- Evaluation


Sepsis Screening Result: No Definite Risk





- Focused Exam


Date Exam was Performed: 06/07/20


Time Exam was Performed: 12:53





- Problem List & Annotations


(1) Palliative care status


SNOMED Code(s): 647254890


   Code(s): Z51.5 - ENCOUNTER FOR PALLIATIVE CARE   Status: Acute   





(2) Left breast mass


SNOMED Code(s): 83391071


   Code(s): N63.20 - UNSPECIFIED LUMP IN THE LEFT BREAST, UNSPECIFIED QUADRANT 

  Status: Acute   





(3) Liver masses


SNOMED Code(s): 068320741


   Code(s): R16.0 - HEPATOMEGALY, NOT ELSEWHERE CLASSIFIED   Status: Acute   





(4) Lymphadenopathy, abdominal


SNOMED Code(s): 394479358


   Code(s): R59.0 - LOCALIZED ENLARGED LYMPH NODES   Status: Acute   





(5) Mass of colon


SNOMED Code(s): 754914446


   Code(s): K63.89 - OTHER SPECIFIED DISEASES OF INTESTINE   Status: Acute   





- Assessment/Plan


Assessment:: 





Liver masses


Colon Mass


Lymphadenopathy of abdomen


Left breast mass


Palliative Care Status





Plan: 





76 yo female who presented to ED via [facility] with c/o abdominal pain. 

Lab work significant for WBC 60756, hgb 9.7, alk phos 987, bilirubin 2.0, CRP 

15.7. Initially proceeded with abdominal US, which revealed multiple masses of 

liver and hepatomegaly. Opted to continue with CT abdomen/pelvis to further 

evaluate abdomen. CT findings highly concerning for metastatic colon cancer. 

Initially consulted with Dr. Angulo to see if he would be willing to biopsy if 

patient/family so wishes. Discussed lab results and CT findings with patient 

and patient's son. Discussed that even with further workup/treatment, 

likelihood of curative therapy at this point is unlikely. Dr. Angulo also 

discussed findings with patient. Both son and patient feel that they would 

rather treat palliatively vs aggressively, but they would like to think about 

options over the weekend. Did discuss that if they wish to proceed with 

treatment, we would need to arrange for biopsy and oncology referral early next 

week. Dr. Angulo discussed case with Darlin ANN at [facility], who will arrange for care 

conference if family wishes. I will touch base to assist in this Monday. 

Patient feels comfortable discharging back to [facility]. Patient will be discharged 

home on oxycodone 5-10 mg PO every 4-6 hours as needed for pain. We will also 

start her on lactulose 20 mg BID. She is advised to stop use of acetaminophen. 

Also recommended Darlin RN consult with patient's psychiatrist regarding 

continued use of psych meds with current liver findings. Patient discharged 

from facility in satisfactory condition. We will arrange for either further 

workup/referral to oncology or hospice referral early next week.